# Patient Record
Sex: FEMALE | Race: WHITE | Employment: FULL TIME | URBAN - METROPOLITAN AREA
[De-identification: names, ages, dates, MRNs, and addresses within clinical notes are randomized per-mention and may not be internally consistent; named-entity substitution may affect disease eponyms.]

---
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
## 2023-02-03 ENCOUNTER — HOSPITAL ENCOUNTER (EMERGENCY)
Age: 23
Discharge: HOME | End: 2023-02-03
Attending: EMERGENCY MEDICINE
Payer: COMMERCIAL

## 2023-02-03 ENCOUNTER — APPOINTMENT (OUTPATIENT)
Dept: GENERAL RADIOLOGY | Age: 23
End: 2023-02-03
Payer: COMMERCIAL

## 2023-02-03 VITALS
HEIGHT: 64 IN | DIASTOLIC BLOOD PRESSURE: 76 MMHG | TEMPERATURE: 98.1 F | OXYGEN SATURATION: 100 % | RESPIRATION RATE: 16 BRPM | SYSTOLIC BLOOD PRESSURE: 136 MMHG | WEIGHT: 187 LBS | BODY MASS INDEX: 31.92 KG/M2 | HEART RATE: 89 BPM

## 2023-02-03 DIAGNOSIS — M54.31 SCIATICA OF RIGHT SIDE: ICD-10-CM

## 2023-02-03 DIAGNOSIS — S39.012A STRAIN OF LUMBAR REGION, INITIAL ENCOUNTER: Primary | ICD-10-CM

## 2023-02-03 LAB
BACTERIA: ABNORMAL /HPF
BILIRUBIN URINE: NEGATIVE
BLOOD, URINE: NEGATIVE
CLARITY: CLEAR
COLOR: YELLOW
EPITHELIAL CELLS, UA: ABNORMAL /HPF
GLUCOSE URINE: NEGATIVE MG/DL
HCG(URINE) PREGNANCY TEST: NEGATIVE
KETONES, URINE: NEGATIVE MG/DL
LEUKOCYTE ESTERASE, URINE: NEGATIVE
NITRITE, URINE: NEGATIVE
PH UA: 8 (ref 5–9)
PROTEIN UA: ABNORMAL MG/DL
RBC UA: ABNORMAL /HPF (ref 0–2)
SPECIFIC GRAVITY UA: 1.01 (ref 1–1.03)
UROBILINOGEN, URINE: 0.2 E.U./DL
WBC UA: ABNORMAL /HPF (ref 0–5)

## 2023-02-03 PROCEDURE — 99284 EMERGENCY DEPT VISIT MOD MDM: CPT

## 2023-02-03 PROCEDURE — 96372 THER/PROPH/DIAG INJ SC/IM: CPT

## 2023-02-03 PROCEDURE — 81025 URINE PREGNANCY TEST: CPT

## 2023-02-03 PROCEDURE — 6360000002 HC RX W HCPCS: Performed by: EMERGENCY MEDICINE

## 2023-02-03 PROCEDURE — 81001 URINALYSIS AUTO W/SCOPE: CPT

## 2023-02-03 PROCEDURE — 72110 X-RAY EXAM L-2 SPINE 4/>VWS: CPT

## 2023-02-03 RX ORDER — CYCLOBENZAPRINE HCL 10 MG
10 TABLET ORAL 3 TIMES DAILY PRN
Qty: 21 TABLET | Refills: 0 | Status: SHIPPED | OUTPATIENT
Start: 2023-02-03 | End: 2023-02-13

## 2023-02-03 RX ORDER — NAPROXEN 250 MG/1
250 TABLET ORAL 2 TIMES DAILY
Qty: 14 TABLET | Refills: 0 | Status: SHIPPED | OUTPATIENT
Start: 2023-02-03 | End: 2023-02-10

## 2023-02-03 RX ORDER — PREDNISONE 50 MG/1
TABLET ORAL
Qty: 5 TABLET | Refills: 0 | Status: SHIPPED | OUTPATIENT
Start: 2023-02-03

## 2023-02-03 RX ORDER — KETOROLAC TROMETHAMINE 30 MG/ML
30 INJECTION, SOLUTION INTRAMUSCULAR; INTRAVENOUS ONCE
Status: COMPLETED | OUTPATIENT
Start: 2023-02-03 | End: 2023-02-03

## 2023-02-03 RX ORDER — ORPHENADRINE CITRATE 30 MG/ML
60 INJECTION INTRAMUSCULAR; INTRAVENOUS ONCE
Status: COMPLETED | OUTPATIENT
Start: 2023-02-03 | End: 2023-02-03

## 2023-02-03 RX ORDER — DEXAMETHASONE SODIUM PHOSPHATE 4 MG/ML
10 INJECTION, SOLUTION INTRA-ARTICULAR; INTRALESIONAL; INTRAMUSCULAR; INTRAVENOUS; SOFT TISSUE ONCE
Status: COMPLETED | OUTPATIENT
Start: 2023-02-03 | End: 2023-02-03

## 2023-02-03 RX ADMIN — ORPHENADRINE CITRATE 60 MG: 30 INJECTION INTRAMUSCULAR; INTRAVENOUS at 11:19

## 2023-02-03 RX ADMIN — DEXAMETHASONE SODIUM PHOSPHATE 10 MG: 4 INJECTION, SOLUTION INTRAMUSCULAR; INTRAVENOUS at 11:19

## 2023-02-03 RX ADMIN — KETOROLAC TROMETHAMINE 30 MG: 30 INJECTION, SOLUTION INTRAMUSCULAR; INTRAVENOUS at 11:19

## 2023-02-03 ASSESSMENT — PAIN DESCRIPTION - LOCATION
LOCATION: BACK

## 2023-02-03 ASSESSMENT — PAIN DESCRIPTION - ORIENTATION: ORIENTATION: RIGHT;LOWER

## 2023-02-03 ASSESSMENT — ENCOUNTER SYMPTOMS
SHORTNESS OF BREATH: 0
EYE REDNESS: 0
VOMITING: 0
NAUSEA: 0
ABDOMINAL PAIN: 0
BACK PAIN: 1

## 2023-02-03 ASSESSMENT — PAIN SCALES - GENERAL
PAINLEVEL_OUTOF10: 4
PAINLEVEL_OUTOF10: 7
PAINLEVEL_OUTOF10: 2

## 2023-02-03 ASSESSMENT — PAIN - FUNCTIONAL ASSESSMENT: PAIN_FUNCTIONAL_ASSESSMENT: 0-10

## 2023-02-03 NOTE — LETTER
[facility] [facility] Emergency Department  [address] [facility] [address]  Phone: [phone]               February 3, 2023    Patient: Demond Carmona   YOB: 2000   Date of Visit: 2/3/2023       To Whom It May Concern:    Demond Carmona was seen and treated in our emergency department on 2/3/2023. She may return to work on 2/5/23.       Sincerely,       Alana Moore RN         Signature:__________________________________

## 2023-02-03 NOTE — ED PROVIDER NOTES
[address] ENCOUNTER        Pt Name: Demond Carmona  MRN: [id number]  [place] 2000  Date of evaluation: 2/3/2023  Provider: Milo Stockton DO  PCP: Laith Tate DO  Note Started: 11:03 AM EST 2/3/23    CHIEF COMPLAINT       Chief Complaint   Patient presents with    Back Pain     Right side lower back pain that radiates down leg last night, no change in urination. HISTORY OF PRESENT ILLNESS: 1 or more Elements       Demond Carmona is a 23 y.o. female who presents to the emergency department with a chief complaint of back pain. The patient states that over the last 2 weeks she has had pain in her right low lumbar spine. States that last night she coughed and she felt something \"pop. \"This is moderate in severity. The pain is sharp radiating down her leg. It is made better. Worse with movement. No treatment prior to arrival.  She denies any bowel or bladder incontinence, saddle anesthesias weakness of the bilateral lower extremity. Denies any fevers, recent epidural injections or history of IV drug abuse. Nursing Notes were all reviewed and agreed with or any disagreements were addressed in the HPI. REVIEW OF SYSTEMS :      Review of Systems   Constitutional:  Negative for chills and fatigue. HENT:  Negative for congestion. Eyes:  Negative for redness. Respiratory:  Negative for shortness of breath. Cardiovascular:  Negative for chest pain. Gastrointestinal:  Negative for abdominal pain, nausea and vomiting. Genitourinary:  Negative for dysuria. Musculoskeletal:  Positive for back pain. Negative for arthralgias. Skin:  Negative for rash. Neurological:  Negative for light-headedness. Psychiatric/Behavioral:  Negative for confusion. All other systems reviewed and are negative. Positives and Pertinent negatives as per HPI.      SURGICAL HISTORY     Past Surgical History: Procedure Laterality Date    ARM SURGERY Right     TONSILLECTOMY         CURRENTMEDICATIONS       Discharge Medication List as of 2/3/2023 12:44 PM        CONTINUE these medications which have NOT CHANGED    Details   melatonin 3 MG TABS tablet Take 3 mg by mouth dailyHistorical Med      ibuprofen (ADVIL;MOTRIN) 400 MG tablet Take 1 tablet by mouth every 6 hours as needed for Pain, Disp-20 tablet, R-0Print      sertraline (ZOLOFT) 50 MG tablet Take 50 mg by mouth dailyHistorical Med             ALLERGIES     Bee venom    FAMILYHISTORY     History reviewed. No pertinent family history. SOCIAL HISTORY       Social History     Tobacco Use    Smoking status: Never    Smokeless tobacco: Never   Substance Use Topics    Alcohol use: No    Drug use: No       SCREENINGS        Minto Coma Scale  Eye Opening: Spontaneous  Best Verbal Response: Oriented  Best Motor Response: Obeys commands  Minto Coma Scale Score: 15                CIWA Assessment  BP: 136/76  Heart Rate: 89           PHYSICAL EXAM  1 or more Elements     ED Triage Vitals   BP Temp Temp src Heart Rate Resp SpO2 Height Weight   02/03/23 1032 02/03/23 1032 -- 02/03/23 1032 02/03/23 1032 02/03/23 1032 02/03/23 1043 02/03/23 1043   134/77 98.1 °F (36.7 °C)  86 16 100 % 5' 4\" (1.626 m) 187 lb (84.8 kg)         Constitutional/General: Alert and oriented x3, well appearing, non toxic in NAD  Head: NC/AT  Eyes:  EOMI  Mouth: Oropharynx clear, handling secretions, no trismus  Neck: Supple, full ROM  Pulmonary: Lungs clear to auscultation bilaterally, no wheezes, rales, or rhonchi. Not in respiratory distress  Cardiovascular:  Regular rate and rhythm, no murmurs, gallops, or rubs. 2+ distal pulses  Abdomen: Soft, non tender, non distended,   Musculoskeletal: Moves all extremities x 4. Warm and well perfused, there is 75 mill strength in bilateral lower extremities, intact distal pulses.   She does have mild tenderness to palpation in the right lower lumbar spine, there is no midline lumbar tenderness to palpation. No overlying erythema, warmth to touch or cellulitic changes  Skin: warm and dry without rash  Neurologic: GCS 15, no gross focal neurologic deficits  Psych: Normal Affect      DIAGNOSTIC RESULTS     I have personally reviewed all laboratory and imaging results for this patient. Results are listed below. LABS:  Results for orders placed or performed during the hospital encounter of 02/03/23   Urinalysis with Microscopic   Result Value Ref Range    Color, UA Yellow Straw/Yellow    Clarity, UA Clear Clear    Glucose, Ur Negative Negative mg/dL    Bilirubin Urine Negative Negative    Ketones, Urine Negative Negative mg/dL    Specific Gravity, UA 1.015 1.005 - 1.030    Blood, Urine Negative Negative    pH, UA 8.0 5.0 - 9.0    Protein, UA TRACE Negative mg/dL    Urobilinogen, Urine 0.2 <2.0 E.U./dL    Nitrite, Urine Negative Negative    Leukocyte Esterase, Urine Negative Negative    WBC, UA 0-1 0 - 5 /HPF    RBC, UA NONE 0 - 2 /HPF    Epithelial Cells, UA FEW /HPF    Bacteria, UA RARE (A) None Seen /HPF   Pregnancy, urine   Result Value Ref Range    HCG(Urine) Pregnancy Test NEGATIVE NEGATIVE       RADIOLOGY:  Interpreted by Radiologist.  XR LUMBAR SPINE (MIN 4 VIEWS)   Final Result   Normal lumbar spine. RADIOLOGY:   Non-plain film images such as CT, Ultrasound and MRI are read by the radiologist. Plain radiographic images are visualized and preliminarily interpreted by the ED Provider       Interpretation per the Radiologist below, if available at the time of this note:    XR LUMBAR SPINE (MIN 4 VIEWS)   Final Result   Normal lumbar spine. No results found. No results found. PROCEDURES   Unless otherwise noted below, none       MDM:   I, Dr. Cheri Givens am the primary physician of record.     Demond Carmona is a 23 y.o. female who presents to the ED for back pain  Vital signs upon arrival /76   Pulse 89   Temp 98.1 °F (36.7 °C)   Resp 16   Ht 5' 4\" (1.626 m)   Wt 187 lb (84.8 kg)   LMP 01/20/2023   SpO2 100%   BMI 32.10 kg/m²     History From: The    Independent Historian: Patient    Limitations to history: None      History: The patient presents to the emergency department with a chief complaint of back pain. The patient states that over the last 2 weeks she has had pain in her right low lumbar spine. States that last night she coughed and she felt something \"pop. \"This is moderate in severity. The pain is sharp radiating down her leg. It is made better. Worse with movement. No treatment prior to arrival.  She denies any bowel or bladder incontinence, saddle anesthesias weakness of the bilateral lower extremity. Denies any fevers, recent epidural injections or history of IV drug abuse. Physical exam: Patient sitting in the bed no acute distress. Heart regular rate and rhythm, lungs clear to auscultation bilaterally. Moves all extremities x 4. Warm and well perfused, there is 75 mill strength in bilateral lower extremities, intact distal pulses. She does have mild tenderness to palpation in the right lower lumbar spine, there is no midline lumbar tenderness to palpation. No overlying erythema, warmth to touch or cellulitic changes    Differential diagnosis includes but not limited to:  Muscle strain  Fracture  Radiculopathy  Sciatic    Patient treated with   Medications   ketorolac (TORADOL) injection 30 mg (30 mg IntraMUSCular Given 2/3/23 1119)   orphenadrine (NORFLEX) injection 60 mg (60 mg IntraMUSCular Given 2/3/23 1119)   dexamethasone (DECADRON) injection 10 mg (10 mg Oral Given 2/3/23 1119)           Imaging reviewed and interpreted by me demonstrates:  Lumbar spine x-ray demonstrates no acute process    Risk/Disposition: Patient presenting emergency department the chief complaint of back pain. The patient has no red flag symptoms or cauda equina. No risk factors for epidural abscess or hematoma. Patient was treated symptomatically. She did have improvement in her symptoms in the emergency department. Bilateral lower extremities were neurovascular intact. Patient will be discharged home follow-up outpatient. Pt will be d/c and will follow up with her PCP . She is educated on signs and symptoms that require emergent evaluation. Pt is advised to return to the ED if her symptoms change or worsen. If her pain persists, pt may need further evaluation. Pt is agreeable to plan and all questions have been answered at this time. PATIENT REFERRED TO:  Marshall Berg DO  [address] [phone]    Call in 2 days      DISCHARGE MEDICATIONS:  Discharge Medication List as of 2/3/2023 12:44 PM        START taking these medications    Details   naproxen (NAPROSYN) 250 MG tablet Take 1 tablet by mouth 2 times daily for 7 days, Disp-14 tablet, R-0Normal      cyclobenzaprine (FLEXERIL) 10 MG tablet Take 1 tablet by mouth 3 times daily as needed for Muscle spasms, Disp-21 tablet, R-0Normal      predniSONE (DELTASONE) 50 MG tablet Take 50mg po qd x 5 days QS for 5 days, Disp-5 tablet, R-0Normal                 EMERGENCY DEPARTMENT COURSE    Vitals:    Vitals:    02/03/23 1032 02/03/23 1043 02/03/23 1250   BP: 134/77  136/76   Pulse: 86  89   Resp: 16     Temp: 98.1 °F (36.7 °C)     SpO2: 100%     Weight:  187 lb (84.8 kg)    Height:  5' 4\" (1.626 m)        ED Course as of 02/03/23 1257   Fri Feb 03, 2023   1231 The patient is in the bed no acute distress. The results of today were discussed. States that she did have some improvement in symptoms. She will discharge home with symptomatic treatment follow-up outpatient [MT]      ED Course User Index  [MT] Tiffany Horn DO          I am the Primary Clinician of Record. FINAL IMPRESSION      1. Strain of lumbar region, initial encounter    2.  Sciatica of right side          DISPOSITION/PLAN      Decision To Discharge 02/03/2023 12:31:25 PM    Patient's disposition: Discharge to home  Patient's condition is stable.            (Please note that portions of this note were completed with a voice recognition program.  Efforts were made to edit the dictations but occasionally words are mis-transcribed.)    So Buchanan DO (electronically signed)       So Buchanan DO  02/03/23 [id number]

## 2024-07-28 ENCOUNTER — HOSPITAL ENCOUNTER (EMERGENCY)
Age: 24
Discharge: HOME | End: 2024-07-28
Payer: COMMERCIAL

## 2024-07-28 VITALS
DIASTOLIC BLOOD PRESSURE: 79 MMHG | HEIGHT: 64 IN | HEART RATE: 97 BPM | RESPIRATION RATE: 16 BRPM | TEMPERATURE: 98 F | BODY MASS INDEX: 32.44 KG/M2 | OXYGEN SATURATION: 98 % | SYSTOLIC BLOOD PRESSURE: 131 MMHG | WEIGHT: 190 LBS

## 2024-07-28 DIAGNOSIS — K64.4 EXTERNAL HEMORRHOID: Primary | ICD-10-CM

## 2024-07-28 PROCEDURE — 6370000000 HC RX 637 (ALT 250 FOR IP): Performed by: NURSE PRACTITIONER

## 2024-07-28 PROCEDURE — 99283 EMERGENCY DEPT VISIT LOW MDM: CPT

## 2024-07-28 RX ORDER — DOCUSATE SODIUM 100 MG/1
100 CAPSULE, LIQUID FILLED ORAL 2 TIMES DAILY
Qty: 20 CAPSULE | Refills: 0 | Status: SHIPPED | OUTPATIENT
Start: 2024-07-28

## 2024-07-28 RX ORDER — HYDROCORTISONE ACETATE PRAMOXINE HCL 1; 1 G/100G; G/100G
CREAM TOPICAL ONCE
Status: COMPLETED | OUTPATIENT
Start: 2024-07-28 | End: 2024-07-28

## 2024-07-28 RX ORDER — HYDROCORTISONE ACETATE 25 MG/1
25 SUPPOSITORY RECTAL 2 TIMES DAILY PRN
Qty: 24 SUPPOSITORY | Refills: 0 | Status: SHIPPED | OUTPATIENT
Start: 2024-07-28 | End: 2024-08-09

## 2024-07-28 RX ORDER — BENZOCAINE/MENTHOL 6 MG-10 MG
LOZENGE MUCOUS MEMBRANE
Qty: 30 G | Refills: 1 | Status: SHIPPED | OUTPATIENT
Start: 2024-07-28 | End: 2024-08-04

## 2024-07-28 RX ADMIN — HYDROCORTISONE ACETATE PRAMOXINE HCL: 1; 1 CREAM TOPICAL at 22:36

## 2024-07-28 ASSESSMENT — LIFESTYLE VARIABLES
HOW MANY STANDARD DRINKS CONTAINING ALCOHOL DO YOU HAVE ON A TYPICAL DAY: 3 OR 4
HOW OFTEN DO YOU HAVE A DRINK CONTAINING ALCOHOL: 2-4 TIMES A MONTH

## 2024-07-29 NOTE — ED PROVIDER NOTES
[facility]   HPI:  7/28/24, Time: 9:51 PM EDT         Sedrick Sprague is a 24 y.o. female presenting to the ED for External Hemorrhoid.  Patient presents emergency department with concerns for external hemorrhoid.  Patient states that she is aware that she has 1 but normally it does not irritate her and even at times she is able to just pressure back up.  Patient reports that she does strain at times to have bowel movements.  Denies noticing any bleeding.  Patient reports otherwise normal state health.  No unusual chest pain or shortness of breath no abdominal pain as well as no noted nausea, vomiting or diarrhea.    Review of Systems:   A complete review of systems was performed and pertinent positives and negatives are stated within HPI, all other systems reviewed and are negative.          --------------------------------------------- PAST HISTORY ---------------------------------------------  Past Medical History:  has a past medical history of Bee sting allergy.    Past Surgical History:  has a past surgical history that includes Arm Surgery (Right) and Tonsillectomy.    Social History:  reports that she has never smoked. She has never used smokeless tobacco. She reports that she does not drink alcohol and does not use drugs.    Family History: family history is not on file.     The patient’s home medications have been reviewed.    Allergies: Bee venom    -------------------------------------------------- RESULTS -------------------------------------------------  All laboratory and radiology results have been personally reviewed by myself   LABS:  No results found for this visit on 07/28/24.    RADIOLOGY:  Interpreted by Radiologist.  No orders to display       ------------------------- NURSING NOTES AND VITALS REVIEWED ---------------------------   The nursing notes within the ED encounter and vital signs as below have been reviewed.   /79   Pulse 97   Temp 98 °F (36.7 °C) (Oral)   Resp 16

## 2024-07-29 NOTE — DISCHARGE INSTRUCTIONS
Take meds for symptom relief.  Follow-up with gastroenterology, call Monday for an appointment to be seen.